# Patient Record
Sex: FEMALE | URBAN - METROPOLITAN AREA
[De-identification: names, ages, dates, MRNs, and addresses within clinical notes are randomized per-mention and may not be internally consistent; named-entity substitution may affect disease eponyms.]

---

## 2019-05-02 ENCOUNTER — Encounter (OUTPATIENT)
Dept: URBAN - METROPOLITAN AREA CLINIC 71 | Facility: CLINIC | Age: 54
End: 2019-05-02
Payer: COMMERCIAL

## 2019-05-02 PROCEDURE — 92004 COMPRE OPH EXAM NEW PT 1/>: CPT | Performed by: OPTOMETRIST

## 2021-10-23 ENCOUNTER — OFFICE VISIT (OUTPATIENT)
Dept: URBAN - METROPOLITAN AREA CLINIC 71 | Facility: CLINIC | Age: 56
End: 2021-10-23
Payer: COMMERCIAL

## 2021-10-23 DIAGNOSIS — H52.13 MYOPIA, BILATERAL: ICD-10-CM

## 2021-10-23 DIAGNOSIS — H25.813 COMBINED FORMS OF AGE-RELATED CATARACT, BILATERAL: ICD-10-CM

## 2021-10-23 DIAGNOSIS — H40.013 OPEN ANGLE WITH BORDERLINE FINDINGS, LOW RISK, BILATERAL: ICD-10-CM

## 2021-10-23 DIAGNOSIS — H52.4 PRESBYOPIA: Primary | ICD-10-CM

## 2021-10-23 DIAGNOSIS — H52.223 REGULAR ASTIGMATISM, BILATERAL: ICD-10-CM

## 2021-10-23 DIAGNOSIS — H02.882 MEIBOMIAN GLAND DYSFUNCTION RIGHT LOWER EYELID: ICD-10-CM

## 2021-10-23 DIAGNOSIS — H02.885 MEIBOMIAN GLAND DYSFUNCTION LEFT LOWER EYELID: ICD-10-CM

## 2021-10-23 PROCEDURE — 92014 COMPRE OPH EXAM EST PT 1/>: CPT

## 2021-10-23 ASSESSMENT — VISUAL ACUITY
OS: 20/20
OD: 20/20

## 2021-10-23 ASSESSMENT — INTRAOCULAR PRESSURE
OS: 10
OD: 12

## 2021-10-25 NOTE — IMPRESSION/PLAN
Impression: Open angle with borderline findings, low risk, bilateral: H40.013. IOP today: 12/10 mm Hg C/D ratio: 0.5 OU Angles: Open Pachy: Unknown IOP Adjustment: Unknown Tmax: Unknown Family history: Yes
Gonio: None Surgeries: None Plan: Patient educated on today's findings. Patient to be monitored d/t family history, cupping and TID OS (possible PDS vs congenital anomaly; patient reports no history of trauma OS). Patient to be monitored yearly w/ OCT.

## 2021-10-25 NOTE — IMPRESSION/PLAN
Impression: Meibomian gland dysfunction right lower eyelid: H02.882. Plan: Patient educated on today's findings. Patient to utilize warm compresses QD for 10-15 mins and lubricate w/ OTC ATs up to QID OU.

## 2021-10-25 NOTE — IMPRESSION/PLAN
Impression: Combined forms of age-related cataract, bilateral: H25.813. Plan: No treatment currently recommended. The patient will monitor vision changes and contact us with any decrease in vision.  Monitor w/ yearly DE.

## 2021-10-25 NOTE — IMPRESSION/PLAN
Impression: Presbyopia: H52.4. Plan: Patient educated on today's findings. Patient given finalized glasses prescription. Patient to RTC if having any issues w/ glasses.

## 2022-02-11 ENCOUNTER — OFFICE VISIT (OUTPATIENT)
Dept: URBAN - METROPOLITAN AREA CLINIC 71 | Facility: CLINIC | Age: 57
End: 2022-02-11
Payer: COMMERCIAL

## 2022-02-11 ENCOUNTER — OFFICE VISIT (OUTPATIENT)
Dept: URBAN - METROPOLITAN AREA CLINIC 44 | Facility: CLINIC | Age: 57
End: 2022-02-11
Payer: COMMERCIAL

## 2022-02-11 DIAGNOSIS — H25.13 NUCLEAR SCLEROSIS CATARACT, BILATERAL: ICD-10-CM

## 2022-02-11 DIAGNOSIS — H43.12 VITREOUS HEMORRHAGE, LEFT EYE: ICD-10-CM

## 2022-02-11 DIAGNOSIS — H33.312 HORSESHOE TEAR OF RETINA WITHOUT DETACHMENT, LEFT EYE: Primary | ICD-10-CM

## 2022-02-11 DIAGNOSIS — H31.093 CHORIORETINAL SCARS, BILATERAL: ICD-10-CM

## 2022-02-11 DIAGNOSIS — H43.812 VITREOUS DEGENERATION, LEFT EYE: ICD-10-CM

## 2022-02-11 PROCEDURE — 99214 OFFICE O/P EST MOD 30 MIN: CPT | Performed by: OPTOMETRIST

## 2022-02-11 PROCEDURE — 67145 PROPH RTA DTCHMNT PC: CPT | Performed by: OPHTHALMOLOGY

## 2022-02-11 PROCEDURE — 99205 OFFICE O/P NEW HI 60 MIN: CPT | Performed by: OPHTHALMOLOGY

## 2022-02-11 PROCEDURE — 92134 CPTRZ OPH DX IMG PST SGM RTA: CPT | Performed by: OPHTHALMOLOGY

## 2022-02-11 ASSESSMENT — KERATOMETRY
OD: 43.38
OS: 43.00

## 2022-02-11 ASSESSMENT — INTRAOCULAR PRESSURE
OD: 10
OD: 15
OS: 13
OS: 13
OD: 15
OS: 10
OD: 10
OS: 10

## 2022-02-11 NOTE — IMPRESSION/PLAN
Impression: Chorioretinal scars, bilateral: H31.093. 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 Inactive.  Observe

## 2022-02-11 NOTE — IMPRESSION/PLAN
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 Plan: Superior temporal HST - LRx performed urgently today without complication. R/B/A discussed at length. Follow-up in 4 weeks for exam, sooner PRN. RD precautions emphasized.

## 2022-02-11 NOTE — IMPRESSION/PLAN
Impression: Vitreous hemorrhage, left eye: H43.12. Plan: Discussed findings with pt. Continue to observe.

## 2022-02-24 ENCOUNTER — OFFICE VISIT (OUTPATIENT)
Dept: URBAN - METROPOLITAN AREA CLINIC 71 | Facility: CLINIC | Age: 57
End: 2022-02-24
Payer: COMMERCIAL

## 2022-02-24 PROCEDURE — 99214 OFFICE O/P EST MOD 30 MIN: CPT

## 2022-02-24 ASSESSMENT — INTRAOCULAR PRESSURE
OD: 12
OS: 10

## 2022-02-24 NOTE — IMPRESSION/PLAN
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 Infrotemporal. Plan: Discussed findings in detail with patient. Discussed risk factors. Refer to retinal specialist tomorrow for evaluation and treatment.  Pt to call office with any issues

## 2022-02-25 ENCOUNTER — OFFICE VISIT (OUTPATIENT)
Dept: URBAN - METROPOLITAN AREA CLINIC 7 | Facility: CLINIC | Age: 57
End: 2022-02-25
Payer: COMMERCIAL

## 2022-02-25 PROCEDURE — 99205 OFFICE O/P NEW HI 60 MIN: CPT | Performed by: OPHTHALMOLOGY

## 2022-02-25 PROCEDURE — 67145 PROPH RTA DTCHMNT PC: CPT | Performed by: OPHTHALMOLOGY

## 2022-02-25 ASSESSMENT — INTRAOCULAR PRESSURE
OS: 11
OD: 10

## 2022-02-25 NOTE — IMPRESSION/PLAN
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 Left. s/p Laser Retinal Tear 02/11/2022 Plan: --exam confirms 2 new peripheral horseshoe retinal tears
--findings/diagnosis d/w patient in detail --recommend urgent laser retinopexy to prevent RD and vision loss --r/b/a of laser retinopexy, cryotherapy, and observation discussed --pt understands risk of RD and vision loss w/o treatment
--pt understands risk of treatment, inc pain, vision loss, floaters --pt elects to proceed with indirect laser retinopexy OS today
--no complications encountered RTC 1 week PERLA in [place] RTC 1-2 weeks PERLA OS

## 2022-03-03 ENCOUNTER — POST-OPERATIVE VISIT (OUTPATIENT)
Dept: URBAN - METROPOLITAN AREA CLINIC 13 | Facility: CLINIC | Age: 57
End: 2022-03-03
Payer: COMMERCIAL

## 2022-03-03 PROCEDURE — 67145 PROPH RTA DTCHMNT PC: CPT | Performed by: OPHTHALMOLOGY

## 2022-03-03 PROCEDURE — 99024 POSTOP FOLLOW-UP VISIT: CPT | Performed by: OPHTHALMOLOGY

## 2022-03-03 ASSESSMENT — INTRAOCULAR PRESSURE
OD: 12
OS: 9

## 2022-03-03 NOTE — IMPRESSION/PLAN
Impression: S/P Laser Retinal Tear OS - 6 Days. Horseshoe tear of retina without detachment, left eye  H33.312. Plan: Retinal break well surrounded by laser. However, new inferior retinal tear within lattice, visible underneath thinning VH. Rec additional barrier laser today. May consider PPV/EL in the future. The patient has a retinal tear which could progress into a retinal detachment causing further visual loss. We discussed the natural history and risk and benefits of the various treatment options including laser and cryo treatment. Risks of treatment include but are not limited to reduced peripheral vision, pain, swelling, recurrent hemorrhage, progressive retinal tear or retinal detachment, new retinal tear and need for additional laser, cryo, or surgery. After discussing the risks, benefits, indications, and alternatives, the patient elects to proceed with laser treatment to the retinal tear. Timeout was performed before procedure. 

1-2 weeks PERLA OS

## 2022-03-10 ENCOUNTER — POST-OPERATIVE VISIT (OUTPATIENT)
Dept: URBAN - METROPOLITAN AREA CLINIC 68 | Facility: CLINIC | Age: 57
End: 2022-03-10
Payer: COMMERCIAL

## 2022-03-10 PROCEDURE — 99024 POSTOP FOLLOW-UP VISIT: CPT | Performed by: OPHTHALMOLOGY

## 2022-03-10 ASSESSMENT — INTRAOCULAR PRESSURE
OS: 10
OD: 12

## 2022-03-10 NOTE — IMPRESSION/PLAN
Impression: S/P Laser Retinal Tear OS - 7 Days. Horseshoe tear of retina without detachment, left eye  H33.312. Plan: Retinal break well surrounded by laser. No new tears, extension of break, or retinal detachment seen. No ERM/CME on exam.
Retinal detachment warnings given. Call ASAP with changes. 

1m DE OS

## 2022-03-11 ENCOUNTER — OFFICE VISIT (OUTPATIENT)
Dept: URBAN - METROPOLITAN AREA CLINIC 70 | Facility: CLINIC | Age: 57
End: 2022-03-11
Payer: COMMERCIAL

## 2022-03-11 DIAGNOSIS — H35.411 LATTICE DEGENERATION OF RETINA, RIGHT EYE: ICD-10-CM

## 2022-03-11 PROCEDURE — 99214 OFFICE O/P EST MOD 30 MIN: CPT | Performed by: OPHTHALMOLOGY

## 2022-03-11 PROCEDURE — 92134 CPTRZ OPH DX IMG PST SGM RTA: CPT | Performed by: OPHTHALMOLOGY

## 2022-03-11 ASSESSMENT — INTRAOCULAR PRESSURE
OD: 14
OS: 15

## 2022-03-11 NOTE — IMPRESSION/PLAN
Impression: Lattice degeneration of retina, right eye: H35.411.  Plan: temporal lattice small holes advise laser prophylaxis, father had laser

## 2022-04-22 ENCOUNTER — OFFICE VISIT (OUTPATIENT)
Dept: URBAN - METROPOLITAN AREA CLINIC 68 | Facility: CLINIC | Age: 57
End: 2022-04-22
Payer: COMMERCIAL

## 2022-04-22 DIAGNOSIS — H43.12 VITREOUS HEMORRHAGE, LEFT EYE: ICD-10-CM

## 2022-04-22 DIAGNOSIS — H33.312 HORSESHOE TEAR OF RETINA WITHOUT DETACHMENT, LEFT EYE: Primary | ICD-10-CM

## 2022-04-22 DIAGNOSIS — H43.812 VITREOUS DEGENERATION, LEFT EYE: ICD-10-CM

## 2022-04-22 DIAGNOSIS — H25.13 AGE-RELATED NUCLEAR CATARACT, BILATERAL: ICD-10-CM

## 2022-04-22 DIAGNOSIS — H35.411 LATTICE DEGENERATION OF RETINA, RIGHT EYE: ICD-10-CM

## 2022-04-22 PROCEDURE — 99213 OFFICE O/P EST LOW 20 MIN: CPT | Performed by: OPHTHALMOLOGY

## 2022-04-22 ASSESSMENT — INTRAOCULAR PRESSURE
OD: 12
OS: 10

## 2022-04-22 NOTE — IMPRESSION/PLAN
Impression: Horseshoe tear of retina without detachment, left eye  H33.312. S/P Laser Retinal Tear OS (03/03/2022)  Plan: Retinal break well surrounded by laser. No new tears, extension of break, or retinal detachment seen. Does have bridging vessel in one of the tears - well surr by laser - may consider PPV/EL/cautery with recurrence No ERM/CME on exam.
Retinal detachment warnings given. Call ASAP with changes.

## 2022-04-22 NOTE — IMPRESSION/PLAN
Impression: Lattice degeneration of retina, right eye: H35.411. Plan: At this time, no retinal tear or retinal detachment is identified. No symptoms/SRF/traction. Retinal detachment symptoms were reviewed, as were RBAC's of laser vs obs with patient who elects obs. Patient was encouraged to call our office if there is an increase in floaters, decrease in vision, or a shadow or curtain is noted in their peripheral vision. Patient would like to consider laser. Will make an appointment and patient can cancel at her discretion.  

within a month Laser OD

## 2022-06-07 ENCOUNTER — PROCEDURE (OUTPATIENT)
Dept: URBAN - METROPOLITAN AREA CLINIC 13 | Facility: CLINIC | Age: 57
End: 2022-06-07
Payer: COMMERCIAL

## 2022-06-07 DIAGNOSIS — H35.411 LATTICE DEGENERATION OF RETINA, RIGHT EYE: Primary | ICD-10-CM

## 2022-06-07 PROCEDURE — 67145 PROPH RTA DTCHMNT PC: CPT | Performed by: OPHTHALMOLOGY

## 2022-06-07 ASSESSMENT — INTRAOCULAR PRESSURE
OS: 13
OD: 11

## 2022-08-11 ENCOUNTER — OFFICE VISIT (OUTPATIENT)
Facility: LOCATION | Age: 57
End: 2022-08-11
Payer: COMMERCIAL

## 2022-08-11 DIAGNOSIS — H43.12 VITREOUS HEMORRHAGE, LEFT EYE: ICD-10-CM

## 2022-08-11 DIAGNOSIS — H43.812 VITREOUS DEGENERATION, LEFT EYE: ICD-10-CM

## 2022-08-11 DIAGNOSIS — H33.312 HORSESHOE TEAR OF RETINA WITHOUT DETACHMENT, LEFT EYE: Primary | ICD-10-CM

## 2022-08-11 DIAGNOSIS — H35.411 LATTICE DEGENERATION OF RETINA, RIGHT EYE: ICD-10-CM

## 2022-08-11 DIAGNOSIS — H25.13 AGE-RELATED NUCLEAR CATARACT, BILATERAL: ICD-10-CM

## 2022-08-11 PROCEDURE — 92134 CPTRZ OPH DX IMG PST SGM RTA: CPT | Performed by: OPHTHALMOLOGY

## 2022-08-11 PROCEDURE — 99213 OFFICE O/P EST LOW 20 MIN: CPT | Performed by: OPHTHALMOLOGY

## 2022-08-11 ASSESSMENT — INTRAOCULAR PRESSURE
OS: 12
OD: 13

## 2022-08-11 NOTE — IMPRESSION/PLAN
Impression: Lattice degeneration of retina, right eye: H35.411.
s/p laser OD - 06/07/2022 OCT OU - no IRF/SRF OU  / 305 Plan: Stable s/p laser = obs.

## 2022-08-11 NOTE — IMPRESSION/PLAN
Impression: Horseshoe tear of retina without detachment, left eye  H33.312. S/P Laser Retinal Tear OS (03/03/2022) Plan: Retinal break well surrounded by laser. No new tears, extension of break, or retinal detachment seen. Does have bridging vessel in one of the tears - well surr by laser - may consider PPV/EL/cautery with recurrence Currently with some residual pigment. Likely permanent = rec obs unless symptomatic and bothersome No ERM/CME on exam.
Retinal detachment warnings given. Call ASAP with changes. 

6m OCT OU

## 2023-03-09 ENCOUNTER — OFFICE VISIT (OUTPATIENT)
Facility: LOCATION | Age: 58
End: 2023-03-09
Payer: COMMERCIAL

## 2023-03-09 DIAGNOSIS — H35.411 LATTICE DEGENERATION OF RETINA, RIGHT EYE: ICD-10-CM

## 2023-03-09 DIAGNOSIS — H25.13 AGE-RELATED NUCLEAR CATARACT, BILATERAL: ICD-10-CM

## 2023-03-09 DIAGNOSIS — H43.12 VITREOUS HEMORRHAGE, LEFT EYE: ICD-10-CM

## 2023-03-09 DIAGNOSIS — H43.812 VITREOUS DEGENERATION, LEFT EYE: ICD-10-CM

## 2023-03-09 DIAGNOSIS — H33.312 HORSESHOE TEAR OF RETINA WITHOUT DETACHMENT, LEFT EYE: Primary | ICD-10-CM

## 2023-03-09 PROCEDURE — 99214 OFFICE O/P EST MOD 30 MIN: CPT | Performed by: OPHTHALMOLOGY

## 2023-03-09 PROCEDURE — 92134 CPTRZ OPH DX IMG PST SGM RTA: CPT | Performed by: OPHTHALMOLOGY

## 2023-03-09 ASSESSMENT — INTRAOCULAR PRESSURE
OS: 12
OD: 12

## 2023-03-09 NOTE — IMPRESSION/PLAN
Impression: Horseshoe tear of retina without detachment, left eye  H33.312. S/P Laser Retinal Tear OS (03/03/2022) Plan: Retinal break well surrounded by laser. No new tears, extension of break, or retinal detachment seen. Does have bridging vessel in one of the tears - well surr by laser - may consider PPV/EL/cautery with recurrence Currently with some residual pigment. Likely permanent = rec continued obs unless symptomatic and bothersome No ERM/CME on exam.
Retinal detachment warnings given. Call ASAP with changes. 

12m OCT OU

## 2023-03-09 NOTE — IMPRESSION/PLAN
Impression: Lattice degeneration of retina, right eye: H35.411.
s/p laser OD - 06/07/2022 OCT OU - no IRF/SRF OU  / 301 Plan: Stable s/p laser = obs.

## 2025-01-14 ENCOUNTER — OFFICE VISIT (OUTPATIENT)
Facility: LOCATION | Age: 60
End: 2025-01-14
Payer: COMMERCIAL

## 2025-01-14 DIAGNOSIS — H43.812 VITREOUS DEGENERATION, LEFT EYE: ICD-10-CM

## 2025-01-14 DIAGNOSIS — H33.312 HORSESHOE TEAR OF RETINA WITHOUT DETACHMENT, LEFT EYE: Primary | ICD-10-CM

## 2025-01-14 DIAGNOSIS — H43.12 VITREOUS HEMORRHAGE, LEFT EYE: ICD-10-CM

## 2025-01-14 DIAGNOSIS — H25.13 AGE-RELATED NUCLEAR CATARACT, BILATERAL: ICD-10-CM

## 2025-01-14 DIAGNOSIS — H35.411 LATTICE DEGENERATION OF RETINA, RIGHT EYE: ICD-10-CM

## 2025-01-14 PROCEDURE — 92134 CPTRZ OPH DX IMG PST SGM RTA: CPT | Performed by: STUDENT IN AN ORGANIZED HEALTH CARE EDUCATION/TRAINING PROGRAM

## 2025-01-14 PROCEDURE — 99214 OFFICE O/P EST MOD 30 MIN: CPT | Performed by: STUDENT IN AN ORGANIZED HEALTH CARE EDUCATION/TRAINING PROGRAM

## 2025-01-14 ASSESSMENT — INTRAOCULAR PRESSURE
OS: 23
OD: 23